# Patient Record
Sex: MALE | Employment: OTHER | URBAN - METROPOLITAN AREA
[De-identification: names, ages, dates, MRNs, and addresses within clinical notes are randomized per-mention and may not be internally consistent; named-entity substitution may affect disease eponyms.]

---

## 2020-02-24 ENCOUNTER — HOSPITAL ENCOUNTER (EMERGENCY)
Facility: CLINIC | Age: 65
Discharge: HOME | End: 2020-02-24
Attending: EMERGENCY MEDICINE | Admitting: EMERGENCY MEDICINE
Payer: COMMERCIAL

## 2020-02-24 ENCOUNTER — APPOINTMENT (OUTPATIENT)
Dept: GENERAL RADIOLOGY | Facility: CLINIC | Age: 65
End: 2020-02-24
Attending: EMERGENCY MEDICINE
Payer: COMMERCIAL

## 2020-02-24 VITALS
OXYGEN SATURATION: 100 % | HEART RATE: 93 BPM | TEMPERATURE: 97.1 F | RESPIRATION RATE: 16 BRPM | DIASTOLIC BLOOD PRESSURE: 89 MMHG | SYSTOLIC BLOOD PRESSURE: 129 MMHG

## 2020-02-24 DIAGNOSIS — R09.A2 FOREIGN BODY SENSATION IN THROAT: ICD-10-CM

## 2020-02-24 PROCEDURE — 31525 DX LARYNGOSCOPY EXCL NB: CPT

## 2020-02-24 PROCEDURE — 70360 X-RAY EXAM OF NECK: CPT

## 2020-02-24 PROCEDURE — 40000275 ZZH STATISTIC RCP TIME EA 10 MIN

## 2020-02-24 PROCEDURE — 40000809 ZZH STATISTIC NO DOCUMENTATION TO SUPPORT CHARGE

## 2020-02-24 PROCEDURE — 25000125 ZZHC RX 250: Performed by: EMERGENCY MEDICINE

## 2020-02-24 PROCEDURE — 99283 EMERGENCY DEPT VISIT LOW MDM: CPT

## 2020-02-24 PROCEDURE — 94640 AIRWAY INHALATION TREATMENT: CPT

## 2020-02-24 PROCEDURE — 25000132 ZZH RX MED GY IP 250 OP 250 PS 637: Performed by: EMERGENCY MEDICINE

## 2020-02-24 RX ORDER — OXYMETAZOLINE HYDROCHLORIDE 0.05 G/100ML
2 SPRAY NASAL ONCE
Status: COMPLETED | OUTPATIENT
Start: 2020-02-24 | End: 2020-02-24

## 2020-02-24 RX ADMIN — LIDOCAINE HYDROCHLORIDE 30 ML: 20 SOLUTION ORAL; TOPICAL at 16:01

## 2020-02-24 RX ADMIN — LIDOCAINE HYDROCHLORIDE 3 ML: 40 INJECTION, SOLUTION RETROBULBAR; TOPICAL at 15:50

## 2020-02-24 RX ADMIN — Medication 2 SPRAY: at 16:04

## 2020-02-24 ASSESSMENT — ENCOUNTER SYMPTOMS
ROS GI COMMENTS: DENIES HEMOPTYSIS
VOMITING: 0
SHORTNESS OF BREATH: 0

## 2020-02-24 NOTE — ED TRIAGE NOTES
Patient comes in for evaluation of a possible fish bone stuck in throat. Was eating fish last night and felt a bone get stuck, has been able to drink water but has not tried anything else. Patient is not having any trouble breathing.

## 2020-02-24 NOTE — ED AVS SNAPSHOT
[facility] Emergency Department  [address]  [facility] [address]  Phone:  [phone]  Fax:  [phone]                                    Daron Weaver   MRN: [id number]    Department:  [facility] Emergency Department   Date of Visit:  2/24/2020           After Visit Summary Signature Page    I have received my discharge instructions, and my questions have been answered. I have discussed any challenges I see with this plan with the nurse or doctor.    ..........................................................................................................................................  Patient/Patient Representative Signature      ..........................................................................................................................................  Patient Representative Print Name and Relationship to Patient    ..................................................               ................................................  Date                                   Time    ..........................................................................................................................................  Reviewed by Signature/Title    ...................................................              ..............................................  Date                                               Time          22EPIC Rev 08/18        verbal instruction

## 2020-02-24 NOTE — ED PROVIDER NOTES
History     Chief Complaint:  Sensation of foreign body in throat    HPI   Daron Weaver is a 64 year old male who presents for evaluation of the sensation of a foreign body in his throat. The patient reports eating fish last night and immediately had the sensation of something stuck in his throat. The patient reports left-sided throat pain, and presents out of concern that there is a fish bone stuck in his throat. He endorses pain when swallowing. He has been tolerating liquids, but has not tried to eat anything today. The patient denies difficulty breathing, vomiting, or hemoptysis. No history of upper airway surgery.     Allergies:  No Known Drug Allergies     Medications:    Metformin  Compazine  Klonopin  Gabapentin  Amlodipine  Lisinopril     Past Medical History:    Renal cell carcinoma  Type II diabetes mellitus  CAD  Renal mass  Hyperlipidemia  Hypertension  Sleep apnea    Past Surgical History:    Coronary artery bypass (2004)  I&D of abscess  CABD    Family History:    Father: Cataract, CAD   Mother: Cataract, diabetes  Sister: type II diabetes mellitus    Social History:  The patient was accompanied to the ED by his wife.  Smoking Status: Never Smoker  Smokeless Tobacco: Never Used  Alcohol Use: Positive  Marital Status:   [2]     Review of Systems   HENT:        Throat pain   Respiratory: Negative for shortness of breath.    Gastrointestinal: Negative for vomiting.        Denies hemoptysis   All other systems reviewed and are negative.      Physical Exam     Patient Vitals for the past 24 hrs:   BP Temp Temp src Pulse Resp SpO2   02/24/20 1550 -- -- -- -- -- 99 %   02/24/20 1412 129/89 97.1  F (36.2  C) Temporal 93 16 100 %        Physical Exam    GEN:    Pleasant, age appropriate.     Resting comfortably in the bed.  HEENT:    Tympanic membranes are clear bilateral.      Oropharynx is moist.       No tonsillar erythema, exudate or asymmetric edema.     No deviation of the uvula.     No  pooling of secretions, trismus or sublingual edema.     No obvious foreign body  Eyes:    Conjunctiva normal, PERRL  Neck:    Supple, no meningismus.       No pain with manipulation of the hyoid.   CV:     Regular rate and rhythm     No murmurs, rubs or gallops.    PULM:    Clear to auscultation bilateral.       No respiratory distress.       No stridor.  ABD:    Soft, non-tender, non-distended.      No rebound or guarding.     No splenomegaly.  MSK:     No gross deformity to all four extremities.   LYMPH:   No cervical lymphadenopathy.  NEURO:   Alert.  Normal muscular tone, no atrophy.  Skin:    Warm, dry and intact.    PSYCH:    Mood is good and affect is appropriate.      Emergency Department Course     Imaging:  Radiographic findings were communicated with the patient who voiced understanding of the findings.    Neck soft tissue XR  IMPRESSION: On the lateral view there is an irregular calcification  just anterior to the C4 vertebral body. This is slightly below the  level of the hyoid bone. Its possible that this is due to  calcification within the carotid bifurcation. I cannot entirely  exclude a calcified foreign body. An AP view could be performed to  determine if there are calcifications at the carotid bifurcations. If  there is a strong suspicion for foreign body, CT scan could be  performed for further evaluation. Reading per radiology.    Procedures:    Physician: Chris Lamar MD    Procedure: Fiberoptic Laryngoscopy    Indication:  Sore throat    The most patent nare was prepped with nebulized Lidocaine 4%, 1 ml, and Afrin nasal spray.      The fiberoptic scope was advanced under direct visualization down to the level of the glottic structures.    Findings:  1.  The epiglottis is normal  2.  The lingual tonsil is normal  3.  The glottic tissues, cords, and pyriform recesses appear normal.  4.  No foreign body is noted.  5.  Linear area of bleeding to the posterior pharynx    There were no  complications to the procedure.      Emergency Department Course:  Past medical records, nursing notes, and vitals reviewed.  1442: I performed an exam of the patient and obtained history, as documented above.    The patient was sent for a Neck soft tissue XR while in the emergency department, results above.      1530 I rechecked and updated the patient.     1604 I performed a laryngoscopy, details above. I discussed the results of the patient's workup thus far.    Findings and plan explained to the Patient. Patient discharged home with instructions regarding supportive care, medications, and reasons to return. The importance of close follow-up was reviewed. The patient was prescribed Magic Mouthwash Suspension.      I personally reviewed the laboratory results with the Patient and answered all related questions prior to discharge.    Impression & Plan        Medical Decision Makin-year-old male presented to the ED with foreign body sensation concerning for fishbone foreign body.  On examination he appears well.  No overt foreign body noted on initial exam.  Soft tissue x-ray undertaken which revealed a possible fishbone versus internal carotid calcification.  Patient underwent fiberoptic laryngoscopy which revealed no foreign body.  Structures all the way to the epiglottis and vocal cords were well visualized with no obvious foreign body.  There was a linear area of bleeding which may be related to recent foreign body.  Patient able to eat and drink without difficulty.  Patient safe for discharge home with supportive measures.  Findings are most consistent with transient foreign body with secondary abrasion.    Diagnosis:    ICD-10-CM    1. Foreign body sensation in throat R09.89        Disposition:  discharged to home    Discharge Medications:  New Prescriptions    MAGIC MOUTHWASH SUSPENSION (DIPHENHYDRAMINE, LIDOCAINE, ALUMINUM-MAGNESIUM & SIMETHICONE)    Swish and swallow 10 mLs in mouth every 6 hours as  needed for mouth sores     I, Mojgan Ambrosio, am serving as a scribe on 2/24/2020 at 2:42 PM to personally document services performed by Chris Lamar MD based on my observations and the provider's statements to me.      Mojgan Ambrosio  2/24/2020   [facility] EMERGENCY DEPARTMENT       Chris Lamar MD  02/24/20 [id number]

## 2020-06-18 ENCOUNTER — ANCILLARY PROCEDURE (OUTPATIENT)
Dept: GENERAL RADIOLOGY | Facility: CLINIC | Age: 65
End: 2020-06-18
Attending: ORTHOPAEDIC SURGERY
Payer: MEDICARE

## 2020-06-18 ENCOUNTER — OFFICE VISIT (OUTPATIENT)
Dept: ORTHOPEDICS | Facility: CLINIC | Age: 65
End: 2020-06-18
Payer: MEDICARE

## 2020-06-18 VITALS
WEIGHT: 190 LBS | BODY MASS INDEX: 28.14 KG/M2 | DIASTOLIC BLOOD PRESSURE: 80 MMHG | HEIGHT: 69 IN | SYSTOLIC BLOOD PRESSURE: 132 MMHG

## 2020-06-18 DIAGNOSIS — M25.561 CHRONIC PAIN OF RIGHT KNEE: Primary | ICD-10-CM

## 2020-06-18 DIAGNOSIS — M25.561 CHRONIC PAIN OF RIGHT KNEE: ICD-10-CM

## 2020-06-18 DIAGNOSIS — G89.29 CHRONIC PAIN OF RIGHT KNEE: Primary | ICD-10-CM

## 2020-06-18 DIAGNOSIS — G89.29 CHRONIC PAIN OF RIGHT KNEE: ICD-10-CM

## 2020-06-18 PROCEDURE — 73562 X-RAY EXAM OF KNEE 3: CPT | Mod: RT | Performed by: ORTHOPAEDIC SURGERY

## 2020-06-18 PROCEDURE — 99204 OFFICE O/P NEW MOD 45 MIN: CPT | Performed by: ORTHOPAEDIC SURGERY

## 2020-06-18 RX ORDER — LISINOPRIL 40 MG/1
40 TABLET ORAL
COMMUNITY
Start: 2020-04-13

## 2020-06-18 ASSESSMENT — MIFFLIN-ST. JEOR: SCORE: 1637.21

## 2020-06-18 NOTE — PATIENT INSTRUCTIONS
Advanced imaging is done by appointment. Some insurance require a prior authorization to be completed which may delay the time until you are able to schedule your appointment.  Please call [facility] [place]: [phone] to schedule your MRI.  Depending on your availability you can usually schedule within the next 1-2 days.    The clinic will call you with results, if you have not heard from the clinic within 3-4 days following your MRI please contact us at the number listed below.   [phone]

## 2020-06-18 NOTE — PROGRESS NOTES
HISTORY OF PRESENT ILLNESS:    Daron Weaver is a 65 year old male who is seen as self referral for right knee pain.  No injury.  Pain has been present since September, 9 months.  According to his history, for some time, he was doing a lot more exercise on a treadmill.  He because he developed a pain, he had to stop working out on the treadmill.  Initially, the pain went away after he discontinued his exercises.  Without any specific injuries or events, he started having pain in the fall 2019.  Present symptoms: instability, pain anterior and medial   Treatments tried to this point: Injections:  Right knee steroid injection done thru [facility] in December 2019.  Only had 2 weeks of relief.    Orthopedic PMH: injury at 11, unsure what was wrong, no surgery.  No issues since that time.      No past medical history on file. coronary artery disease       No past surgical history on file.  Bypass surgery 2025 please check his postop appointment.  Based on the imaging study available    No family history on file.  Father with a history of heart disease and mother with a history of diabetes and cancer    Social History     Socioeconomic History     Marital status:      Spouse name: Not on file     Number of children: Not on file     Years of education: Not on file     Highest education level: Not on file   Occupational History     Not on file   Social Needs     Financial resource strain: Not on file     Food insecurity     Worry: Not on file     Inability: Not on file     Transportation needs     Medical: Not on file     Non-medical: Not on file   Tobacco Use     Smoking status: Never Smoker     Smokeless tobacco: Never Used   Substance and Sexual Activity     Alcohol use: Not on file     Drug use: Not on file     Sexual activity: Not on file   Lifestyle     Physical activity     Days per week: Not on file     Minutes per session: Not on file     Stress: Not on file   Relationships     Social connections     Talks on  "phone: Not on file     Gets together: Not on file     Attends Taoist service: Not on file     Active member of club or organization: Not on file     Attends meetings of clubs or organizations: Not on file     Relationship status: Not on file     Intimate partner violence     Fear of current or ex partner: Not on file     Emotionally abused: Not on file     Physically abused: Not on file     Forced sexual activity: Not on file   Other Topics Concern     Not on file   Social History Narrative     Not on file       Current Outpatient Medications   Medication Sig Dispense Refill     lisinopril (ZESTRIL) 40 MG tablet Take 40 mg by mouth       magic mouthwash suspension (diphenhydrAMINE, lidocaine, aluminum-magnesium & simethicone) Swish and swallow 10 mLs in mouth every 6 hours as needed for mouth sores 120 mL 1     metFORMIN (GLUCOPHAGE) 1000 MG tablet          No Known Allergies    REVIEW OF SYSTEMS:  CONSTITUTIONAL:  NEGATIVE for fever, chills, change in weight  INTEGUMENTARY/SKIN:  NEGATIVE for worrisome rashes, moles or lesions  EYES:  NEGATIVE for vision changes or irritation  ENT/MOUTH:  NEGATIVE for ear, mouth and throat problems  RESP:  NEGATIVE for significant cough or SOB  BREAST:  NEGATIVE for masses, tenderness or discharge  CV: Hypertension and heart failure not in acute distress peripheral edema  GI:  NEGATIVE for nausea, abdominal pain, heartburn, or change in bowel habits  :  Negative   MUSCULOSKELETAL:  See HPI above  NEURO:  NEGATIVE for weakness, dizziness or paresthesias  ENDOCRINE:  NEGATIVE for temperature intolerance, skin/hair changes  HEME/ALLERGY/IMMUNE:  NEGATIVE for bleeding problems  PSYCHIATRIC:  NEGATIVE for changes in mood or affect      PHYSICAL EXAM:  /88   Ht 1.753 m (5' 9\")   Wt 86.2 kg (190 lb)   BMI 28.06 kg/m    Body mass index is 28.06 kg/m .   GENERAL APPEARANCE: healthy, alert and no distress   SKIN: no suspicious lesions or rashes  NEURO: Normal strength and tone, " mentation intact and speech normal  VASCULAR: Good pulses, and capillary refill   LYMPH: no lymphadenopathy   PSYCH:  mentation appears normal and affect normal/bright    MSK:  Not in acute distress  Walks with a slight limp  No significant erythema, swelling or deformity, right knee    Mild patellofemoral crepitus and mild tenderness with a palpation  Ligaments are stable throughout the knee  Medial joint line pain, increasing pain with deep flexion  Negative Milana's tibial tubercle is not tender  Lateral joint line is not tender    Skin is intact  Circulation is intact    ASSESSMENT:  Chronic right knee pain  Suspected medial meniscus tear  Mild patellofemoral DJD    PLAN:   the x-rays of the right knee from today were visualized.  He does have a mild bone spur and a subchondral sclerosis.  Despite obvious mild degenerative changes at the patellofemoral joint region, his pain is mostly well localized to the medial joint space.  The medial joint space is well maintained.  There does not appear to be significant degenerative changes.  For that reason, we are suspecting possible chronic medial meniscus tear that has not gone away.  We will recommend MRI scan evaluation.  We will try to connect on the phone after MRI scan is done to go over the findings.  All the questions were answered.      Imaging Interpretation:   Mild degenerative changes at the patellofemoral joint.      Demarcus Duron MD  Department of Orthopedic Surgery        Disclaimer: This note consists of symbols derived from keyboarding, dictation and/or voice recognition software. As a result, there may be errors in the script that have gone undetected. Please consider this when interpreting information found in this chart.

## 2020-06-18 NOTE — LETTER
6/18/2020         RE: Daron Weaver  [address] [facility] [address]        Dear Colleague,    Thank you for referring your patient, Daron Weaver, to the [facility]. Please see a copy of my visit note below.    HISTORY OF PRESENT ILLNESS:    Daron Weaver is a 65 year old male who is seen as self referral for right knee pain.  No injury.  Pain has been present since September, 9 months.  According to his history, for some time, he was doing a lot more exercise on a treadmill.  He because he developed a pain, he had to stop working out on the treadmill.  Initially, the pain went away after he discontinued his exercises.  Without any specific injuries or events, he started having pain in the fall 2019.  Present symptoms: instability, pain anterior and medial   Treatments tried to this point: Injections:  Right knee steroid injection done thru [facility] in December 2019.  Only had 2 weeks of relief.    Orthopedic PMH: injury at 11, unsure what was wrong, no surgery.  No issues since that time.      No past medical history on file. coronary artery disease       No past surgical history on file.  Bypass surgery 2025 please check his postop appointment.  Based on the imaging study available    No family history on file.  Father with a history of heart disease and mother with a history of diabetes and cancer    Social History     Socioeconomic History     Marital status:      Spouse name: Not on file     Number of children: Not on file     Years of education: Not on file     Highest education level: Not on file   Occupational History     Not on file   Social Needs     Financial resource strain: Not on file     Food insecurity     Worry: Not on file     Inability: Not on file     Transportation needs     Medical: Not on file     Non-medical: Not on file   Tobacco Use     Smoking status: Never Smoker     Smokeless tobacco: Never Used   Substance and Sexual Activity      Alcohol use: Not on file     Drug use: Not on file     Sexual activity: Not on file   Lifestyle     Physical activity     Days per week: Not on file     Minutes per session: Not on file     Stress: Not on file   Relationships     Social connections     Talks on phone: Not on file     Gets together: Not on file     Attends Muslim service: Not on file     Active member of club or organization: Not on file     Attends meetings of clubs or organizations: Not on file     Relationship status: Not on file     Intimate partner violence     Fear of current or ex partner: Not on file     Emotionally abused: Not on file     Physically abused: Not on file     Forced sexual activity: Not on file   Other Topics Concern     Not on file   Social History Narrative     Not on file       Current Outpatient Medications   Medication Sig Dispense Refill     lisinopril (ZESTRIL) 40 MG tablet Take 40 mg by mouth       magic mouthwash suspension (diphenhydrAMINE, lidocaine, aluminum-magnesium & simethicone) Swish and swallow 10 mLs in mouth every 6 hours as needed for mouth sores 120 mL 1     metFORMIN (GLUCOPHAGE) 1000 MG tablet          No Known Allergies    REVIEW OF SYSTEMS:  CONSTITUTIONAL:  NEGATIVE for fever, chills, change in weight  INTEGUMENTARY/SKIN:  NEGATIVE for worrisome rashes, moles or lesions  EYES:  NEGATIVE for vision changes or irritation  ENT/MOUTH:  NEGATIVE for ear, mouth and throat problems  RESP:  NEGATIVE for significant cough or SOB  BREAST:  NEGATIVE for masses, tenderness or discharge  CV: Hypertension and heart failure not in acute distress peripheral edema  GI:  NEGATIVE for nausea, abdominal pain, heartburn, or change in bowel habits  :  Negative   MUSCULOSKELETAL:  See HPI above  NEURO:  NEGATIVE for weakness, dizziness or paresthesias  ENDOCRINE:  NEGATIVE for temperature intolerance, skin/hair changes  HEME/ALLERGY/IMMUNE:  NEGATIVE for bleeding problems  PSYCHIATRIC:  NEGATIVE for changes in mood  "or affect      PHYSICAL EXAM:  /88   Ht 1.753 m (5' 9\")   Wt 86.2 kg (190 lb)   BMI 28.06 kg/m    Body mass index is 28.06 kg/m .   GENERAL APPEARANCE: healthy, alert and no distress   SKIN: no suspicious lesions or rashes  NEURO: Normal strength and tone, mentation intact and speech normal  VASCULAR: Good pulses, and capillary refill   LYMPH: no lymphadenopathy   PSYCH:  mentation appears normal and affect normal/bright    MSK:  Not in acute distress  Walks with a slight limp  No significant erythema, swelling or deformity, right knee    Mild patellofemoral crepitus and mild tenderness with a palpation  Ligaments are stable throughout the knee  Medial joint line pain, increasing pain with deep flexion  Negative Milana's tibial tubercle is not tender  Lateral joint line is not tender    Skin is intact  Circulation is intact    ASSESSMENT:  Chronic right knee pain  Suspected medial meniscus tear  Mild patellofemoral DJD    PLAN:   the x-rays of the right knee from today were visualized.  He does have a mild bone spur and a subchondral sclerosis.  Despite obvious mild degenerative changes at the patellofemoral joint region, his pain is mostly well localized to the medial joint space.  The medial joint space is well maintained.  There does not appear to be significant degenerative changes.  For that reason, we are suspecting possible chronic medial meniscus tear that has not gone away.  We will recommend MRI scan evaluation.  We will try to connect on the phone after MRI scan is done to go over the findings.  All the questions were answered.      Imaging Interpretation:   Mild degenerative changes at the patellofemoral joint.      Demarcus Duron MD  Department of Orthopedic Surgery        Disclaimer: This note consists of symbols derived from keyboarding, dictation and/or voice recognition software. As a result, there may be errors in the script that have gone undetected. Please consider this when interpreting " information found in this chart.    Again, thank you for allowing me to participate in the care of your patient.        Sincerely,        Demarcus Duron MD

## 2020-06-26 ENCOUNTER — HOSPITAL ENCOUNTER (OUTPATIENT)
Dept: MRI IMAGING | Facility: CLINIC | Age: 65
Discharge: HOME | End: 2020-06-26
Attending: ORTHOPAEDIC SURGERY | Admitting: ORTHOPAEDIC SURGERY
Payer: MEDICARE

## 2020-06-26 DIAGNOSIS — M25.561 CHRONIC PAIN OF RIGHT KNEE: ICD-10-CM

## 2020-06-26 DIAGNOSIS — G89.29 CHRONIC PAIN OF RIGHT KNEE: ICD-10-CM

## 2020-06-26 PROCEDURE — 73721 MRI JNT OF LWR EXTRE W/O DYE: CPT | Mod: RT

## 2020-07-07 ENCOUNTER — TELEPHONE (OUTPATIENT)
Dept: ORTHOPEDICS | Facility: CLINIC | Age: 65
End: 2020-07-07

## 2020-07-07 NOTE — TELEPHONE ENCOUNTER
Patient calls requesting MRI results from right knee MRI that was obtained on 6/26/20.     Per last office visit on 6/18/20, Dr. Duron will reach out to patient to discuss MRI results.   PLAN:   the x-rays of the right knee from today were visualized.  He does have a mild bone spur and a subchondral sclerosis.  Despite obvious mild degenerative changes at the patellofemoral joint region, his pain is mostly well localized to the medial joint space.  The medial joint space is well maintained.  There does not appear to be significant degenerative changes.  For that reason, we are suspecting possible chronic medial meniscus tear that has not gone away.  We will recommend MRI scan evaluation.  We will try to connect on the phone after MRI scan is done to go over the findings.    MR  right   knee without contrast 6/26/2020 12:24 PM     Techniques: Multiplanar multisequence imaging of the right knee was   obtained without administration of intra-articular or intravenous   contrast using routine protocol.     History: Knee instability; ; evaluate for  medial meniscus tear;   Chronic pain of right knee; Chronic pain of right knee     Comparison: Radiograph right knee dated 6/18/2020     Findings:     The anterior and posterior cruciate ligaments are intact and   unremarkable. The medial collateral ligament is preserved. The lateral   supporting structures, including the iliotibial band, the lateral   collateral ligament, the popliteus and biceps femoris tendons are   intact.     In the medial compartment, there is significant degenerative tearing   of the medial meniscus with a flipped fragment of the body into the   coronary recess, a high-grade tear is noted at the junction of the   posterior horn to the posterior root attachment. There is associated   moderate articular cartilage loss in the weightbearing aspect of the   posterior tibial plateau with associated bone marrow edema in the   proximal posterior tibia. Moderate  grade cartilage loss at the   peripheral aspect of the distal femoral condyle is noted.     In the lateral compartment there is mild blunting of the body of the   lateral meniscus. No definite significant meniscal tear. There is mild   cartilage fissuring of the articular cartilaginous surfaces of the   lateral compartment in the patellofemoral joint.     In the patellofemoral joint there is mild to moderate grade articular   cartilage fissuring involving the lateral patella facet. No   full-thickness cartilage defect. Mild articular cartilage fissuring is   noted in the central trochlear groove.     The extensor mechanism is intact there is mild edema in the   superolateral Hoffa's fat pad.     Small joint effusion, tiny popliteal cyst. No bony abnormalities, no   significant osteophyte formation    Impression:      IMPRESSION:   1. High-grade degenerative tearing of the body and posterior horn of   the medial meniscus with a near full-thickness tear of the posterior   horn/posterior root attachment and a displaced small flap into the   coronary recess. Focal, moderate to high-grade articular cartilage   loss, affecting the far lateral tibial plateau with associated marrow   edema.   2. Mild cartilage abnormalities with in the lateral compartment and   mild to moderate cartilage abnormalities in the patellofemoral joint.   2. Small joint effusion.     JUTTA ELLERMANN, MD        Please advise if patient to schedule follow up appointment, or call to inform patient of next steps.     Erica [place], ATC

## 2020-07-07 NOTE — TELEPHONE ENCOUNTER
Spoke with patient. Informed him that Dr. Duron is in surgery today however he is back in clinic tomorrow. Informed him that Dr. Duron will reach out to him to review results when he is back in clinic.     Mary Ellen Barraza M.Ed., LAT, ATC

## 2020-07-08 NOTE — TELEPHONE ENCOUNTER
I contacted him today and went over the findings.  It was felt that visualization of the MRI scan images and further discussion as to what to do might be the best for him.  He will make an appointment to see me in the future.

## 2020-07-09 ENCOUNTER — OFFICE VISIT (OUTPATIENT)
Dept: ORTHOPEDICS | Facility: CLINIC | Age: 65
End: 2020-07-09
Payer: MEDICARE

## 2020-07-09 VITALS
DIASTOLIC BLOOD PRESSURE: 72 MMHG | WEIGHT: 188.6 LBS | HEIGHT: 69 IN | BODY MASS INDEX: 27.93 KG/M2 | SYSTOLIC BLOOD PRESSURE: 118 MMHG

## 2020-07-09 DIAGNOSIS — M17.11 PRIMARY OSTEOARTHRITIS OF RIGHT KNEE: Primary | ICD-10-CM

## 2020-07-09 PROCEDURE — 99213 OFFICE O/P EST LOW 20 MIN: CPT | Performed by: ORTHOPAEDIC SURGERY

## 2020-07-09 ASSESSMENT — MIFFLIN-ST. JEOR: SCORE: 1630.86

## 2020-07-09 NOTE — PROGRESS NOTES
"HISTORY OF PRESENT ILLNESS:    Daron Weaver is a 65 year old male who is seen in follow up for Right knee pain, and MRI review.  Present symptoms: Patient reports continued right knee pain located in the anterior and medial aspects.   Current pain level: 3/10   Treatments tried to this point: no new treatments since his last office visit on 6/18/20.  Past Medical History: Unchanged from the visit of 6-18-20. . Please refer to that note.    REVIEW OF SYSTEMS:  CONSTITUTIONAL:  NEGATIVE for fever, chills, change in weight  INTEGUMENTARY/SKIN:  NEGATIVE for worrisome rashes, moles or lesions  EYES:  NEGATIVE for vision changes or irritation  ENT/MOUTH:  NEGATIVE for ear, mouth and throat problems  RESP:  NEGATIVE for significant cough or SOB  BREAST:  NEGATIVE for masses, tenderness or discharge  CV:  NEGATIVE for chest pain, palpitations or peripheral edema  GI:  NEGATIVE for nausea, abdominal pain, heartburn, or change in bowel habits  :  Negative   MUSCULOSKELETAL:  See HPI above  NEURO:  NEGATIVE for weakness, dizziness or paresthesias  ENDOCRINE:  NEGATIVE for temperature intolerance, skin/hair changes  HEME/ALLERGY/IMMUNE:  NEGATIVE for bleeding problems  PSYCHIATRIC:  NEGATIVE for changes in mood or affect    PHYSICAL EXAM:  /72 (BP Location: Right arm, Patient Position: Chair, Cuff Size: Adult Regular)   Ht 1.753 m (5' 9\")   Wt 85.5 kg (188 lb 9.6 oz)   BMI 27.85 kg/m    Body mass index is 27.85 kg/m .   GENERAL APPEARANCE: healthy, alert and no distress   SKIN: no suspicious lesions or rashes  NEURO: Normal strength and tone, mentation intact and speech normal  VASCULAR:  good pulses, and cappillary refill   LYMPH: no lymphadenopathy   PSYCH:  mentation appears normal and affect normal/bright    MSK:  Mild varus alignment, right knee   Continuing palpable tenderness in the posterior medial aspect of the right knee  No swelling of significance  No erythema or warmth          IMAGING " INTERPRETATION:    Recent Results (from the past 744 hour(s))   XR Knee Right 3 Views    Narrative     History: Chronic medial joint space pain since fall 2019.  Previous   cortisone injection did not provide any lasting impact.  No history of   acute trauma.  Impression: 3 views of the right knee demonstrate presence of mild   degenerative changes at the patellofemoral joint with the presence of bone   spurs and subchondral sclerosis.  Patella is noted to be rather flat.    However, the tracking is noted to be intact.  The medial and lateral joint   spaces are well-maintained.  Otherwise, no acute fractures or other   osseous pathology are noted.   MR Knee Right w/o Contrast    Narrative    MR  right   knee without contrast 6/26/2020 12:24 PM    Techniques: Multiplanar multisequence imaging of the right knee was  obtained without administration of intra-articular or intravenous  contrast using routine protocol.    History: Knee instability; ; evaluate for  medial meniscus tear;  Chronic pain of right knee; Chronic pain of right knee    Comparison: Radiograph right knee dated 6/18/2020    Findings:    The anterior and posterior cruciate ligaments are intact and  unremarkable. The medial collateral ligament is preserved. The lateral  supporting structures, including the iliotibial band, the lateral  collateral ligament, the popliteus and biceps femoris tendons are  intact.    In the medial compartment, there is significant degenerative tearing  of the medial meniscus with a flipped fragment of the body into the  coronary recess, a high-grade tear is noted at the junction of the  posterior horn to the posterior root attachment. There is associated  moderate articular cartilage loss in the weightbearing aspect of the  posterior tibial plateau with associated bone marrow edema in the  proximal posterior tibia. Moderate grade cartilage loss at the  peripheral aspect of the distal femoral condyle is noted.    In the  lateral compartment there is mild blunting of the body of the  lateral meniscus. No definite significant meniscal tear. There is mild  cartilage fissuring of the articular cartilaginous surfaces of the  lateral compartment in the patellofemoral joint.    In the patellofemoral joint there is mild to moderate grade articular  cartilage fissuring involving the lateral patella facet. No  full-thickness cartilage defect. Mild articular cartilage fissuring is  noted in the central trochlear groove.    The extensor mechanism is intact there is mild edema in the  superolateral Hoffa's fat pad.    Small joint effusion, tiny popliteal cyst. No bony abnormalities, no  significant osteophyte formation       Impression    IMPRESSION:  1. High-grade degenerative tearing of the body and posterior horn of  the medial meniscus with a near full-thickness tear of the posterior  horn/posterior root attachment and a displaced small flap into the  coronary recess. Focal, moderate to high-grade articular cartilage  loss, affecting the far lateral tibial plateau with associated marrow  edema.   2. Mild cartilage abnormalities with in the lateral compartment and  mild to moderate cartilage abnormalities in the patellofemoral joint.  2. Small joint effusion.    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  Findings are thoroughly explained.  Compared to the plain x-rays of June 18, 2020, the MRI scan reveals a much more involved degenerative process of the medial compartment as well as patellofemoral compartment.  We felt that knee arthroscopy may not be prudent since the outcome is unpredictable.  Even if it is successful,  most likely will be short living in terms of duration of the benefit.  Alternatives were discussed.  We may consider hyaluronic acid injection and  cortisone injection but has a long term treatment, it would be reasonable for us to consider unloading brace to provide subchondral bone edema to resolve in the medial compartment.  Eventually he will be a candidate for knee replacement in the future.    After our lengthy discussion, he agreed to consider  brace.  For that reason he will be referred to orthotics.  We will see him back in few months to see how he is doing with .       It should be noted that an off-the-shelf  brace was try to be fitted.  However because of his body habitus, the fitting was poor.  Thus, he would need a custom  brace.    Demarcus Duron MD  Dept. Orthopedic Surgery  [facility]       Disclaimer: This note consists of symbols derived from keyboarding, dictation and/or voice recognition software. As a result, there may be errors in the script that have gone undetected. Please consider this when interpreting information found in this chart.

## 2020-07-09 NOTE — LETTER
"    7/9/2020         RE: Daron Weaver  [address] [facility] [address]        Dear Colleague,    Thank you for referring your patient, Daron Weaver, to the [facility] ORTH[facility]ERY. Please see a copy of my visit note below.    HISTORY OF PRESENT ILLNESS:    Daron Weaver is a 65 year old male who is seen in follow up for Right knee pain, and MRI review.  Present symptoms: Patient reports continued right knee pain located in the anterior and medial aspects.   Current pain level: 3/10   Treatments tried to this point: no new treatments since his last office visit on 6/18/20.  Past Medical History: Unchanged from the visit of 6-18-20. . Please refer to that note.    REVIEW OF SYSTEMS:  CONSTITUTIONAL:  NEGATIVE for fever, chills, change in weight  INTEGUMENTARY/SKIN:  NEGATIVE for worrisome rashes, moles or lesions  EYES:  NEGATIVE for vision changes or irritation  ENT/MOUTH:  NEGATIVE for ear, mouth and throat problems  RESP:  NEGATIVE for significant cough or SOB  BREAST:  NEGATIVE for masses, tenderness or discharge  CV:  NEGATIVE for chest pain, palpitations or peripheral edema  GI:  NEGATIVE for nausea, abdominal pain, heartburn, or change in bowel habits  :  Negative   MUSCULOSKELETAL:  See HPI above  NEURO:  NEGATIVE for weakness, dizziness or paresthesias  ENDOCRINE:  NEGATIVE for temperature intolerance, skin/hair changes  HEME/ALLERGY/IMMUNE:  NEGATIVE for bleeding problems  PSYCHIATRIC:  NEGATIVE for changes in mood or affect    PHYSICAL EXAM:  /72 (BP Location: Right arm, Patient Position: Chair, Cuff Size: Adult Regular)   Ht 1.753 m (5' 9\")   Wt 85.5 kg (188 lb 9.6 oz)   BMI 27.85 kg/m    Body mass index is 27.85 kg/m .   GENERAL APPEARANCE: healthy, alert and no distress   SKIN: no suspicious lesions or rashes  NEURO: Normal strength and tone, mentation intact and speech normal  VASCULAR:  good pulses, and cappillary refill   LYMPH: no lymphadenopathy "   PSYCH:  mentation appears normal and affect normal/bright    MSK:  Mild varus alignment, right knee   Continuing palpable tenderness in the posterior medial aspect of the right knee  No swelling of significance  No erythema or warmth          IMAGING INTERPRETATION:    Recent Results (from the past 744 hour(s))   XR Knee Right 3 Views    Narrative     History: Chronic medial joint space pain since fall 2019.  Previous   cortisone injection did not provide any lasting impact.  No history of   acute trauma.  Impression: 3 views of the right knee demonstrate presence of mild   degenerative changes at the patellofemoral joint with the presence of bone   spurs and subchondral sclerosis.  Patella is noted to be rather flat.    However, the tracking is noted to be intact.  The medial and lateral joint   spaces are well-maintained.  Otherwise, no acute fractures or other   osseous pathology are noted.   MR Knee Right w/o Contrast    Narrative    MR  right   knee without contrast 6/26/2020 12:24 PM    Techniques: Multiplanar multisequence imaging of the right knee was  obtained without administration of intra-articular or intravenous  contrast using routine protocol.    History: Knee instability; ; evaluate for  medial meniscus tear;  Chronic pain of right knee; Chronic pain of right knee    Comparison: Radiograph right knee dated 6/18/2020    Findings:    The anterior and posterior cruciate ligaments are intact and  unremarkable. The medial collateral ligament is preserved. The lateral  supporting structures, including the iliotibial band, the lateral  collateral ligament, the popliteus and biceps femoris tendons are  intact.    In the medial compartment, there is significant degenerative tearing  of the medial meniscus with a flipped fragment of the body into the  coronary recess, a high-grade tear is noted at the junction of the  posterior horn to the posterior root attachment. There is associated  moderate articular  cartilage loss in the weightbearing aspect of the  posterior tibial plateau with associated bone marrow edema in the  proximal posterior tibia. Moderate grade cartilage loss at the  peripheral aspect of the distal femoral condyle is noted.    In the lateral compartment there is mild blunting of the body of the  lateral meniscus. No definite significant meniscal tear. There is mild  cartilage fissuring of the articular cartilaginous surfaces of the  lateral compartment in the patellofemoral joint.    In the patellofemoral joint there is mild to moderate grade articular  cartilage fissuring involving the lateral patella facet. No  full-thickness cartilage defect. Mild articular cartilage fissuring is  noted in the central trochlear groove.    The extensor mechanism is intact there is mild edema in the  superolateral Hoffa's fat pad.    Small joint effusion, tiny popliteal cyst. No bony abnormalities, no  significant osteophyte formation       Impression    IMPRESSION:  1. High-grade degenerative tearing of the body and posterior horn of  the medial meniscus with a near full-thickness tear of the posterior  horn/posterior root attachment and a displaced small flap into the  coronary recess. Focal, moderate to high-grade articular cartilage  loss, affecting the far lateral tibial plateau with associated marrow  edema.   2. Mild cartilage abnormalities with in the lateral compartment and  mild to moderate cartilage abnormalities in the patellofemoral joint.  2. Small joint effusion.    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  Findings are thoroughly explained.  Compared to the plain x-rays of June 18,  2020, the MRI scan reveals a much more involved degenerative process of the medial compartment as well as patellofemoral compartment.  We felt that knee arthroscopy may not be prudent since the outcome is unpredictable.  Even if it is successful, most likely will be short living in terms of duration of the benefit.  Alternatives were discussed.  We may consider hyaluronic acid injection and  cortisone injection but has a long term treatment, it would be reasonable for us to consider unloading brace to provide subchondral bone edema to resolve in the medial compartment.  Eventually he will be a candidate for knee replacement in the future.    After our lengthy discussion, he agreed to consider  brace.  For that reason he will be referred to orthotics.  We will see him back in few months to see how he is doing with .           Demarcus Duron MD  Dept. Orthopedic Surgery  [facility]       Disclaimer: This note consists of symbols derived from keyboarding, dictation and/or voice recognition software. As a result, there may be errors in the script that have gone undetected. Please consider this when interpreting information found in this chart.      Again, thank you for allowing me to participate in the care of your patient.        Sincerely,        Demarcus Duron MD

## 2020-07-09 NOTE — PATIENT INSTRUCTIONS
Medial  Brace has been ordered through Orthotics Department. They will call you to schedule fitting of the brace.     Call my office with any questions or concerns, [phone].

## 2021-07-19 ENCOUNTER — TRANSCRIBE ORDERS (OUTPATIENT)
Dept: OTHER | Age: 66
End: 2021-07-19

## 2021-07-19 DIAGNOSIS — Z95.1 S/P CABG (CORONARY ARTERY BYPASS GRAFT): Primary | ICD-10-CM

## 2021-08-10 ENCOUNTER — TRANSCRIBE ORDERS (OUTPATIENT)
Dept: CARDIAC REHAB | Facility: CLINIC | Age: 66
End: 2021-08-10

## 2021-08-10 DIAGNOSIS — Z95.1 S/P CABG X 3: Primary | ICD-10-CM

## 2021-08-11 ENCOUNTER — HOSPITAL ENCOUNTER (OUTPATIENT)
Dept: CARDIAC REHAB | Facility: CLINIC | Age: 66
End: 2021-08-11
Attending: THORACIC SURGERY (CARDIOTHORACIC VASCULAR SURGERY)
Payer: MEDICARE

## 2021-08-11 DIAGNOSIS — Z95.1 S/P CABG X 3: ICD-10-CM

## 2021-08-11 DIAGNOSIS — Z95.1 S/P CABG (CORONARY ARTERY BYPASS GRAFT): ICD-10-CM

## 2021-08-11 PROCEDURE — 93798 PHYS/QHP OP CAR RHAB W/ECG: CPT

## 2024-11-20 ENCOUNTER — HOSPITAL ENCOUNTER (EMERGENCY)
Facility: CLINIC | Age: 69
Discharge: HOME | End: 2024-11-21
Attending: STUDENT IN AN ORGANIZED HEALTH CARE EDUCATION/TRAINING PROGRAM
Payer: MEDICARE

## 2024-11-20 ENCOUNTER — APPOINTMENT (OUTPATIENT)
Dept: RADIOLOGY | Facility: CLINIC | Age: 69
End: 2024-11-20
Attending: STUDENT IN AN ORGANIZED HEALTH CARE EDUCATION/TRAINING PROGRAM
Payer: MEDICARE

## 2024-11-20 DIAGNOSIS — A08.4 VIRAL GASTROENTERITIS: ICD-10-CM

## 2024-11-20 PROBLEM — T46.6X5D ADVERSE EFFECT OF ANTIHYPERLIPIDEMIC AND ANTIARTERIOSCLEROTIC DRUGS, SUBSEQUENT ENCOUNTER: Status: ACTIVE | Noted: 2018-02-01

## 2024-11-20 PROBLEM — Z96.1 PSEUDOPHAKIA, RIGHT EYE: Status: ACTIVE | Noted: 2021-10-28

## 2024-11-20 PROBLEM — H52.03 HYPEROPIA, BILATERAL: Status: ACTIVE | Noted: 2020-09-16

## 2024-11-20 PROBLEM — R79.89 ELEVATED TROPONIN: Status: ACTIVE | Noted: 2021-04-05

## 2024-11-20 LAB
ALBUMIN SERPL BCG-MCNC: 4.1 G/DL (ref 3.5–5.2)
ALBUMIN UR-MCNC: 100 MG/DL
ALP SERPL-CCNC: 83 U/L (ref 40–150)
ALT SERPL W P-5'-P-CCNC: 28 U/L (ref 0–70)
ANION GAP SERPL CALCULATED.3IONS-SCNC: 15 MMOL/L (ref 7–15)
APPEARANCE UR: CLEAR
AST SERPL W P-5'-P-CCNC: 32 U/L (ref 0–45)
BASOPHILS # BLD AUTO: 0 10E3/UL (ref 0–0.2)
BASOPHILS NFR BLD AUTO: 0 %
BILIRUB DIRECT SERPL-MCNC: <0.2 MG/DL (ref 0–0.3)
BILIRUB SERPL-MCNC: 0.3 MG/DL
BILIRUB UR QL STRIP: NEGATIVE
BUN SERPL-MCNC: 19.2 MG/DL (ref 8–23)
CALCIUM SERPL-MCNC: 9.1 MG/DL (ref 8.8–10.4)
CHLORIDE SERPL-SCNC: 99 MMOL/L (ref 98–107)
COLOR UR AUTO: ABNORMAL
CREAT SERPL-MCNC: 1.44 MG/DL (ref 0.67–1.17)
EGFRCR SERPLBLD CKD-EPI 2021: 53 ML/MIN/1.73M2
EOSINOPHIL # BLD AUTO: 0 10E3/UL (ref 0–0.7)
EOSINOPHIL NFR BLD AUTO: 0 %
ERYTHROCYTE [DISTWIDTH] IN BLOOD BY AUTOMATED COUNT: 13.5 % (ref 10–15)
FLUAV RNA SPEC QL NAA+PROBE: NEGATIVE
FLUBV RNA RESP QL NAA+PROBE: NEGATIVE
GLUCOSE SERPL-MCNC: 269 MG/DL (ref 70–99)
GLUCOSE UR STRIP-MCNC: 1000 MG/DL
HCO3 SERPL-SCNC: 19 MMOL/L (ref 22–29)
HCT VFR BLD AUTO: 43.9 % (ref 40–53)
HGB BLD-MCNC: 15.2 G/DL (ref 13.3–17.7)
HGB UR QL STRIP: NEGATIVE
IMM GRANULOCYTES # BLD: 0 10E3/UL
IMM GRANULOCYTES NFR BLD: 1 %
KETONES UR STRIP-MCNC: NEGATIVE MG/DL
LACTATE SERPL-SCNC: 1.6 MMOL/L (ref 0.7–2)
LEUKOCYTE ESTERASE UR QL STRIP: NEGATIVE
LYMPHOCYTES # BLD AUTO: 0.7 10E3/UL (ref 0.8–5.3)
LYMPHOCYTES NFR BLD AUTO: 10 %
MAGNESIUM SERPL-MCNC: 1.7 MG/DL (ref 1.7–2.3)
MCH RBC QN AUTO: 30.5 PG (ref 26.5–33)
MCHC RBC AUTO-ENTMCNC: 34.6 G/DL (ref 31.5–36.5)
MCV RBC AUTO: 88 FL (ref 78–100)
MONOCYTES # BLD AUTO: 0.7 10E3/UL (ref 0–1.3)
MONOCYTES NFR BLD AUTO: 10 %
MUCOUS THREADS #/AREA URNS LPF: PRESENT /LPF
NEUTROPHILS # BLD AUTO: 5.2 10E3/UL (ref 1.6–8.3)
NEUTROPHILS NFR BLD AUTO: 79 %
NITRATE UR QL: NEGATIVE
NRBC # BLD AUTO: 0 10E3/UL
NRBC BLD AUTO-RTO: 0 /100
PH UR STRIP: 5.5 [PH] (ref 5–7)
PLATELET # BLD AUTO: 192 10E3/UL (ref 150–450)
POTASSIUM SERPL-SCNC: 4.4 MMOL/L (ref 3.4–5.3)
PROT SERPL-MCNC: 7.8 G/DL (ref 6.4–8.3)
RBC # BLD AUTO: 4.99 10E6/UL (ref 4.4–5.9)
RBC URINE: <1 /HPF
RSV RNA SPEC NAA+PROBE: NEGATIVE
SARS-COV-2 RNA RESP QL NAA+PROBE: POSITIVE
SODIUM SERPL-SCNC: 133 MMOL/L (ref 135–145)
SP GR UR STRIP: 1.02 (ref 1–1.03)
SPERM #/AREA URNS HPF: PRESENT /HPF
TROPONIN T SERPL HS-MCNC: 15 NG/L
UROBILINOGEN UR STRIP-MCNC: <2 MG/DL
WBC # BLD AUTO: 6.6 10E3/UL (ref 4–11)
WBC URINE: 1 /HPF

## 2024-11-20 PROCEDURE — 87637 SARSCOV2&INF A&B&RSV AMP PRB: CPT | Performed by: STUDENT IN AN ORGANIZED HEALTH CARE EDUCATION/TRAINING PROGRAM

## 2024-11-20 PROCEDURE — 36415 COLL VENOUS BLD VENIPUNCTURE: CPT | Performed by: STUDENT IN AN ORGANIZED HEALTH CARE EDUCATION/TRAINING PROGRAM

## 2024-11-20 PROCEDURE — 99285 EMERGENCY DEPT VISIT HI MDM: CPT | Mod: 25

## 2024-11-20 PROCEDURE — 96360 HYDRATION IV INFUSION INIT: CPT

## 2024-11-20 PROCEDURE — 71046 X-RAY EXAM CHEST 2 VIEWS: CPT

## 2024-11-20 PROCEDURE — 93005 ELECTROCARDIOGRAM TRACING: CPT | Performed by: EMERGENCY MEDICINE

## 2024-11-20 PROCEDURE — 81001 URINALYSIS AUTO W/SCOPE: CPT | Performed by: EMERGENCY MEDICINE

## 2024-11-20 PROCEDURE — 84484 ASSAY OF TROPONIN QUANT: CPT | Performed by: STUDENT IN AN ORGANIZED HEALTH CARE EDUCATION/TRAINING PROGRAM

## 2024-11-20 PROCEDURE — 83605 ASSAY OF LACTIC ACID: CPT | Performed by: STUDENT IN AN ORGANIZED HEALTH CARE EDUCATION/TRAINING PROGRAM

## 2024-11-20 PROCEDURE — 258N000003 HC RX IP 258 OP 636: Performed by: STUDENT IN AN ORGANIZED HEALTH CARE EDUCATION/TRAINING PROGRAM

## 2024-11-20 PROCEDURE — 82248 BILIRUBIN DIRECT: CPT | Performed by: STUDENT IN AN ORGANIZED HEALTH CARE EDUCATION/TRAINING PROGRAM

## 2024-11-20 PROCEDURE — 80053 COMPREHEN METABOLIC PANEL: CPT | Performed by: STUDENT IN AN ORGANIZED HEALTH CARE EDUCATION/TRAINING PROGRAM

## 2024-11-20 PROCEDURE — 83735 ASSAY OF MAGNESIUM: CPT | Performed by: STUDENT IN AN ORGANIZED HEALTH CARE EDUCATION/TRAINING PROGRAM

## 2024-11-20 PROCEDURE — 93005 ELECTROCARDIOGRAM TRACING: CPT | Performed by: STUDENT IN AN ORGANIZED HEALTH CARE EDUCATION/TRAINING PROGRAM

## 2024-11-20 PROCEDURE — 85025 COMPLETE CBC W/AUTO DIFF WBC: CPT | Performed by: STUDENT IN AN ORGANIZED HEALTH CARE EDUCATION/TRAINING PROGRAM

## 2024-11-20 RX ORDER — ONDANSETRON 2 MG/ML
4 INJECTION INTRAMUSCULAR; INTRAVENOUS EVERY 30 MIN PRN
Status: DISCONTINUED | OUTPATIENT
Start: 2024-11-20 | End: 2024-11-21 | Stop reason: HOSPADM

## 2024-11-20 RX ADMIN — SODIUM CHLORIDE 1000 ML: 9 INJECTION, SOLUTION INTRAVENOUS at 22:40

## 2024-11-20 ASSESSMENT — ACTIVITIES OF DAILY LIVING (ADL)
ADLS_ACUITY_SCORE: 0
ADLS_ACUITY_SCORE: 0

## 2024-11-21 VITALS
TEMPERATURE: 99.5 F | OXYGEN SATURATION: 99 % | HEIGHT: 69 IN | DIASTOLIC BLOOD PRESSURE: 87 MMHG | BODY MASS INDEX: 27.4 KG/M2 | WEIGHT: 185 LBS | RESPIRATION RATE: 20 BRPM | HEART RATE: 103 BPM | SYSTOLIC BLOOD PRESSURE: 191 MMHG

## 2024-11-21 LAB
ATRIAL RATE - MUSE: 123 BPM
DIASTOLIC BLOOD PRESSURE - MUSE: 100 MMHG
INTERPRETATION ECG - MUSE: NORMAL
P AXIS - MUSE: 51 DEGREES
PR INTERVAL - MUSE: 158 MS
QRS DURATION - MUSE: 80 MS
QT - MUSE: 360 MS
QTC - MUSE: 515 MS
R AXIS - MUSE: -50 DEGREES
SYSTOLIC BLOOD PRESSURE - MUSE: 181 MMHG
T AXIS - MUSE: 97 DEGREES
TROPONIN T SERPL HS-MCNC: 14 NG/L
VENTRICULAR RATE- MUSE: 123 BPM

## 2024-11-21 PROCEDURE — 258N000003 HC RX IP 258 OP 636: Performed by: STUDENT IN AN ORGANIZED HEALTH CARE EDUCATION/TRAINING PROGRAM

## 2024-11-21 PROCEDURE — 96361 HYDRATE IV INFUSION ADD-ON: CPT

## 2024-11-21 PROCEDURE — 36415 COLL VENOUS BLD VENIPUNCTURE: CPT | Performed by: STUDENT IN AN ORGANIZED HEALTH CARE EDUCATION/TRAINING PROGRAM

## 2024-11-21 PROCEDURE — 84484 ASSAY OF TROPONIN QUANT: CPT | Performed by: STUDENT IN AN ORGANIZED HEALTH CARE EDUCATION/TRAINING PROGRAM

## 2024-11-21 RX ORDER — ONDANSETRON 4 MG/1
4 TABLET, ORALLY DISINTEGRATING ORAL EVERY 8 HOURS PRN
Qty: 10 TABLET | Refills: 0 | Status: SHIPPED | OUTPATIENT
Start: 2024-11-21 | End: 2024-11-24

## 2024-11-21 RX ADMIN — SODIUM CHLORIDE 500 ML: 9 INJECTION, SOLUTION INTRAVENOUS at 00:40

## 2024-11-21 ASSESSMENT — ACTIVITIES OF DAILY LIVING (ADL)
ADLS_ACUITY_SCORE: 0

## 2024-11-21 NOTE — ED PROVIDER NOTES
EMERGENCY DEPARTMENT ENCOUNTER      NAME: Daron Weaver  AGE: 69 year old male  YOB: 1955  MRN: [id number]  EVALUATION DATE & TIME: 11/20/2024 10:01 PM    PCP: Patrice Saleh [facility]    ED PROVIDER: Tera Ruano MD      Chief Complaint   Patient presents with    Chest Pain    Nausea, Vomiting, & Diarrhea         FINAL IMPRESSION:  1. Viral gastroenteritis          ED COURSE & MEDICAL DECISION MAKING:    Pertinent Labs & Imaging studies reviewed. (See chart for details)  69 year old male presents to the Emergency Department for evaluation of nausea, vomiting, diarrhea    ED Course as of 11/21/24 0503   Wed Nov 20, 2024   2213 Patient is a 69-year-old male with history of diabetes, hypertension who presents emergency department for evaluation of nausea vomiting diarrhea as well as chest pain that started early this morning around 1 AM and has persisted since.  He has been having recurrent vomiting and unable to tolerate oral intake.  Patient got back from [place] over the weekend.  Initially was having some diarrhea on Sunday which began to improve but then around 1 AM this morning recurrent nonbloody diarrhea.  Is also had recurrent episodes of nonbloody nonbilious emesis.  Has not had any associated abdominal cramping or abdominal pain.  Notes some mild left-sided chest pain as well without any associated shortness of breath.  No fevers.  No cough or sore throat.  Denies any urinary symptoms.  No one else at home with similar symptoms.    Exam patient is tachycardic but normotensive.  Temp 99.5 but afebrile.  Saturating well on room air.  Exam shows tachycardic heart rate but regular rhythm.  Warm and well-perfused remedies and strong radial pulse.  Lungs are clear without any wheezes rales.  Abdomen soft nontender without any guarding or rebound.   slightly dry mucous membranes.    Initial differential includes was not limited to viral or bacterial gastroenteritis, ACS.  Lower  suspicion at this point in time for process such as small bowel obstruction as patient has no abdominal pain.  Will obtain blood work to evaluate for significant underlying metabolic derangements.  Also obtain EKG and troponin to evaluate for possible ACS.   2228 EKG shows a sinus tachycardia at 123 beats a minute, left axis deviation, MS interval 150 ms, QRS 80 ms, QTc 515 ms, no ST elevation, T wave inversions in 1 and aVL, no prior EKGs available for comparison.   2357 Labs reviewed interpreted myself.   2359   CBC shows normal white count and hemoglobin.  Creatinine 1.44 increased from a baseline around 1#borderline ERICA, no significant electrolyte derangements and no anion gap.  Lactate within normal limits.  Magnesium also within normal limits.  Initial troponin reassuring at 15 and given duration of the symptoms of chest pain since this morning low suspicion for ACS.  Will repeat a delta troponin to evaluate for any significant increase.  Patient is COVID-positive I suspect this might be the cause of his GI symptoms.    Chest x-ray shows questionable atelectatic versus and right-sided pulmonary opacity however he does not have any cough or fever lower suspicion this point in time for bacterial pneumonia and will hold off on empiric treatment this could exacerbate his underlying diarrhea.   u Nov 21, 2024   0051 Repeat troponin stable.  Upon repeat evaluation patient notes improvement in symptoms.  Does not any vomiting while in the emergency department.  1 episode of diarrhea here but overall says the frequency of diarrhea is improving.  His tachycardia beginning to improve now with a rate in the low 100s.  Will give a additional 500 mL bolus of crystalloid and see if patient is able to tolerate oral intake.         Upon repeat evaluation patient's had improvement in symptoms no recurrent vomiting.  Feels comfortable discharge home at this point in time.  Will send home with a short course of Zofran for as  needed nausea control otherwise signs and symptoms of worsening condition were discussed and return precautions given.      10:23 PM I met and evaluated the patient.   2:56 AM We discussed the plan for discharge and the patient is agreeable. Reviewed supportive cares, symptomatic treatment, outpatient follow up, and reasons to return to the Emergency Department. All questions and concerns were addressed. Patient to be discharged by ED RN.       Medical Decision Making  Obtained supplemental history:Supplemental history obtained?: No  Reviewed external records: External records reviewed?: Documented in chart  Care impacted by chronic illness:Diabetes, Heart Disease, and Hypertension  Care significantly affected by social determinants of health:N/A  Did you consider but not order tests?: Work up considered but not performed and documented in chart, if applicable  Did you interpret images independently?: Independent interpretation of ECG and images noted in documentation, when applicable.  Consultation discussion with other provider:Did you involve another provider (consultant, MH, pharmacy, etc.)?: No  Discharge. I prescribed additional prescription strength medication(s) as charted. I considered admission, but discharged patient after significant clinical improvement.  Not Applicable          At the conclusion of the encounter I discussed the results of all of the tests and the disposition. The questions were answered. The patient or family acknowledged understanding and was agreeable with the care plan.     0 minutes of critical care time     MEDICATIONS GIVEN IN THE EMERGENCY:  Medications   ondansetron (ZOFRAN) injection 4 mg (4 mg Intravenous Not Given 11/20/24 2306)   sodium chloride 0.9% BOLUS 1,000 mL (0 mLs Intravenous Stopped 11/21/24 0005)   sodium chloride 0.9% BOLUS 500 mL (0 mLs Intravenous Stopped 11/21/24 0240)       NEW PRESCRIPTIONS STARTED AT TODAY'S ER VISIT  Discharge Medication List as of  11/21/2024  2:41 AM        START taking these medications    Details   ondansetron (ZOFRAN ODT) 4 MG ODT tab Take 1 tablet (4 mg) by mouth every 8 hours as needed for vomiting or nausea., Disp-10 tablet, R-0, E-Prescribe                =================================================================    [facility]    Patient information was obtained from: the patient     Use of : N/A         Daron Weaver is a 69 year old male with a pertinent history of CAD with coronary bypass graft, essential hypertension, type 2 diabetes, and hyperlipidemia who presents to this ED by by private car for evaluation of diarrhea and chest pain.       The patient reports having severe diarrhea constantly since 1:00 PM yesterday (11/19). No blood in stool. There is a mild abdominal pain across his abdomen present only when he has an episode of diarrhea. States he is feeling dizzy with nausea right now. No known sick contact. No recent antibiotic intake. States he recently returned from [facility] and did have an episode of diarrhea 3 days ago while there. Denies any urinary symptoms.     Secondary, the patient reports an onset of mild left-sided chest pain since this morning. Notes he has not been taking his medications, like metformin, in the last 2 weeks. Otherwise, no recent medication changes.     Medical history of 2 coronary bypass. The patient denies fever, cough, sore throat, rhinorrhea, and any other complaints at this time.       REVIEW OF SYSTEMS   Refer to the [facility]    PAST MEDICAL HISTORY:  History reviewed. No pertinent past medical history.    PAST SURGICAL HISTORY:  History reviewed. No pertinent surgical history.        CURRENT MEDICATIONS:    ondansetron (ZOFRAN ODT) 4 MG ODT tab  lisinopril (ZESTRIL) 40 MG tablet  magic mouthwash suspension (diphenhydrAMINE, lidocaine, aluminum-magnesium & simethicone)  metFORMIN (GLUCOPHAGE) 1000 MG tablet        ALLERGIES:  No Known Allergies    FAMILY HISTORY:  History  "reviewed. No pertinent family history.    SOCIAL HISTORY:   Social History     Socioeconomic History    Marital status:    Tobacco Use    Smoking status: Never    Smokeless tobacco: Never       VITALS:  BP (!) 191/87   Pulse 103   Temp 99.5  F (37.5  C) (Temporal)   Resp 20   Ht 1.753 m (5' 9\")   Wt 83.9 kg (185 lb)   SpO2 99%   BMI 27.32 kg/m      PHYSICAL EXAM    Constitutional: Well developed, Well nourished, NAD,  HENT: Normocephalic, Atraumatic,  mucous membranes moist,   Neck- trachea midline, No stridor.    Eyes:EOMI, Conjunctiva normal, No discharge.   Respiratory: Normal breath sounds, No respiratory distress, No wheezing.    Cardiovascular: Mild tachycardic heart rate, Regular rhythm,  No murmurs,   Abdominal: Soft, No tenderness, No rebound or guarding.     Musculoskeletal: no deformity or malalignment, warm diffused extremities   Integument: Warm, Dry, No erythema,    Neurologic: Alert & oriented x 3   Psychiatric: Affect normal, Cooperative.      LAB:  All pertinent labs reviewed and interpreted.  Results for orders placed or performed during the hospital encounter of 11/20/24   Chest XR,  PA & LAT    Impression    IMPRESSION: PA and lateral views of the chest were obtained. Postsurgical changes of cardiac surgery with median sternotomy wires and surgical clips. Enlarged cardiac silhouette. Bilateral right basilar pulmonary opacities, could be atelectatic or   infectious. No suspicious focal pulmonary opacities. No significant pleural effusion or pneumothorax.       Basic metabolic panel   Result Value Ref Range    Sodium 133 (L) 135 - 145 mmol/L    Potassium 4.4 3.4 - 5.3 mmol/L    Chloride 99 98 - 107 mmol/L    Carbon Dioxide (CO2) 19 (L) 22 - 29 mmol/L    Anion Gap 15 7 - 15 mmol/L    Urea Nitrogen 19.2 8.0 - 23.0 mg/dL    Creatinine 1.44 (H) 0.67 - 1.17 mg/dL    GFR Estimate 53 (L) >60 mL/min/1.73m2    Calcium 9.1 8.8 - 10.4 mg/dL    Glucose 269 (H) 70 - 99 mg/dL   Hepatic function " panel   Result Value Ref Range    Protein Total 7.8 6.4 - 8.3 g/dL    Albumin 4.1 3.5 - 5.2 g/dL    Bilirubin Total 0.3 <=1.2 mg/dL    Alkaline Phosphatase 83 40 - 150 U/L    AST 32 0 - 45 U/L    ALT 28 0 - 70 U/L    Bilirubin Direct <0.20 0.00 - 0.30 mg/dL   Lactic acid whole blood with 1x repeat in 2 hr when >2   Result Value Ref Range    Lactic Acid, Initial 1.6 0.7 - 2.0 mmol/L   Result Value Ref Range    Troponin T, High Sensitivity 15 <=22 ng/L   Result Value Ref Range    Magnesium 1.7 1.7 - 2.3 mg/dL   UA with Microscopic reflex to Culture    Specimen: Urine, Clean Catch   Result Value Ref Range    Color Urine Light Yellow Colorless, Straw, Light Yellow, Yellow    Appearance Urine Clear Clear    Glucose Urine 1000 (A) Negative mg/dL    Bilirubin Urine Negative Negative    Ketones Urine Negative Negative mg/dL    Specific Gravity Urine 1.022 1.001 - 1.030    Blood Urine Negative Negative    pH Urine 5.5 5.0 - 7.0    Protein Albumin Urine 100 (A) Negative mg/dL    Urobilinogen Urine <2.0 <2.0 mg/dL    Nitrite Urine Negative Negative    Leukocyte Esterase Urine Negative Negative    Mucus Urine Present (A) None Seen /LPF    Sperm Urine Present (A) None Seen /HPF    RBC Urine <1 <=2 /HPF    WBC Urine 1 <=5 /HPF   CBC with platelets and differential   Result Value Ref Range    WBC Count 6.6 4.0 - 11.0 10e3/uL    RBC Count 4.99 4.40 - 5.90 10e6/uL    Hemoglobin 15.2 13.3 - 17.7 g/dL    Hematocrit 43.9 40.0 - 53.0 %    MCV 88 78 - 100 fL    MCH 30.5 26.5 - 33.0 pg    MCHC 34.6 31.5 - 36.5 g/dL    RDW 13.5 10.0 - 15.0 %    Platelet Count 192 150 - 450 10e3/uL    % Neutrophils 79 %    % Lymphocytes 10 %    % Monocytes 10 %    % Eosinophils 0 %    % Basophils 0 %    % Immature Granulocytes 1 %    NRBCs per 100 WBC 0 <1 /100    Absolute Neutrophils 5.2 1.6 - 8.3 10e3/uL    Absolute Lymphocytes 0.7 (L) 0.8 - 5.3 10e3/uL    Absolute Monocytes 0.7 0.0 - 1.3 10e3/uL    Absolute Eosinophils 0.0 0.0 - 0.7 10e3/uL    Absolute  Basophils 0.0 0.0 - 0.2 10e3/uL    Absolute Immature Granulocytes 0.0 <=0.4 10e3/uL    Absolute NRBCs 0.0 10e3/uL   Influenza A/B, RSV and SARS-CoV2 PCR (COVID-19) Nasopharyngeal    Specimen: Nasopharyngeal; Swab   Result Value Ref Range    Influenza A PCR Negative Negative    Influenza B PCR Negative Negative    RSV PCR Negative Negative    SARS CoV2 PCR Positive (A) Negative   Result Value Ref Range    Troponin T, High Sensitivity 14 <=22 ng/L       RADIOLOGY:  Reviewed all pertinent imaging. Please see official radiology report.  Chest XR,  PA & LAT   Final Result   IMPRESSION: PA and lateral views of the chest were obtained. Postsurgical changes of cardiac surgery with median sternotomy wires and surgical clips. Enlarged cardiac silhouette. Bilateral right basilar pulmonary opacities, could be atelectatic or    infectious. No suspicious focal pulmonary opacities. No significant pleural effusion or pneumothorax.               EKG:    Performed at:     Impression: EKG shows a sinus tachycardia at 123 beats a minute, left axis deviation, OK interval 150 ms, QRS 80 ms, QTc 515 ms, no ST elevation, T wave inversions in 1 and aVL, no prior EKGs available for comparison.        I have independently reviewed and interpreted the EKG(s) documented above.    PROCEDURES:         [employer] [facility] Documentation:   CMS Diagnoses:              I, Modestojeanforest Franco, am serving as a scribe to document services personally performed by Tera Ruano MD based on my observation and the provider's statements to me. I, Tera Ruano MD, attest that Yashira Franco is acting in a scribe capacity, has observed my performance of the services and has documented them in accordance with my direction.    Tera Ruano MD  [facility] EMERGENCY ROOM  [address] [id number] [phone]      Tera Ruano MD  11/21/24 5854

## 2024-11-21 NOTE — ED TRIAGE NOTES
Patient arrives to the ER with N/V/D and chest pain/. Patient started having diarrhea early this morning around 0200, then the nausea/vomiting started along with chest pain. He states that he is unable to keep any fluids down. Tachycardic in triage. Afebrile.     Triage Assessment (Adult)       Row Name 11/20/24 8338          Triage Assessment    Airway WDL WDL        Respiratory WDL    Respiratory WDL WDL        Skin Circulation/Temperature WDL    Skin Circulation/Temperature WDL WDL        Cardiac WDL    Cardiac WDL X;chest pain        Chest Pain Assessment    Chest Pain Location anterior chest, left     Chest Pain Intervention 12-lead ECG obtained        Peripheral/Neurovascular WDL    Peripheral Neurovascular WDL WDL        Cognitive/Neuro/Behavioral WDL    Cognitive/Neuro/Behavioral WDL WDL

## 2024-11-21 NOTE — DISCHARGE INSTRUCTIONS
Your COVID test was positive and I suspect this is the cause of your nausea vomiting and diarrhea.  Ultimately this is treated by controlling symptoms.  You been given a prescription for medication called Zofran to help with nausea.  You may also try Imodium (loperamide) that is available over-the-counter to help with frequent diarrhea.  I suspect your symptoms will begin to improve over the next few days.  Make sure you are staying well-hydrated and drinking combination of water and electrolyte containing fluids such as Gatorade.  If you develop recurrent and intractable vomiting despite the Zofran.  Significantly worsening abdominal pain or concern for recurrent dehydration please return to the emergency department for repeat evaluation.